# Patient Record
Sex: MALE | Race: WHITE | Employment: FULL TIME | URBAN - METROPOLITAN AREA
[De-identification: names, ages, dates, MRNs, and addresses within clinical notes are randomized per-mention and may not be internally consistent; named-entity substitution may affect disease eponyms.]

---

## 2017-04-01 ENCOUNTER — APPOINTMENT (OUTPATIENT)
Dept: GENERAL RADIOLOGY | Age: 32
End: 2017-04-01
Attending: EMERGENCY MEDICINE
Payer: COMMERCIAL

## 2017-04-01 ENCOUNTER — NURSE ONLY (OUTPATIENT)
Dept: FAMILY MEDICINE CLINIC | Facility: CLINIC | Age: 32
End: 2017-04-01

## 2017-04-01 ENCOUNTER — HOSPITAL ENCOUNTER (OUTPATIENT)
Age: 32
Discharge: HOME | End: 2017-04-01
Attending: EMERGENCY MEDICINE
Payer: COMMERCIAL

## 2017-04-01 VITALS
RESPIRATION RATE: 20 BRPM | BODY MASS INDEX: 25.76 KG/M2 | HEART RATE: 96 BPM | DIASTOLIC BLOOD PRESSURE: 79 MMHG | SYSTOLIC BLOOD PRESSURE: 122 MMHG | TEMPERATURE: 98 F | HEIGHT: 68 IN | WEIGHT: 170 LBS | OXYGEN SATURATION: 96 %

## 2017-04-01 DIAGNOSIS — S61.411A LACERATION OF RIGHT HAND WITHOUT FOREIGN BODY, INITIAL ENCOUNTER: Primary | ICD-10-CM

## 2017-04-01 DIAGNOSIS — Z02.9 ADMINISTRATIVE ENCOUNTER: Primary | ICD-10-CM

## 2017-04-01 PROCEDURE — 12002 RPR S/N/AX/GEN/TRNK2.6-7.5CM: CPT

## 2017-04-01 PROCEDURE — 99203 OFFICE O/P NEW LOW 30 MIN: CPT

## 2017-04-01 PROCEDURE — 90471 IMMUNIZATION ADMIN: CPT

## 2017-04-01 RX ORDER — CEPHALEXIN 250 MG/1
250 TABLET ORAL 3 TIMES DAILY
Qty: 15 TABLET | Refills: 0 | Status: SHIPPED | OUTPATIENT
Start: 2017-04-01 | End: 2018-01-11 | Stop reason: ALTCHOICE
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
## 2018-01-11 ENCOUNTER — OFFICE VISIT (OUTPATIENT)
Dept: DERMATOLOGY CLINIC | Facility: CLINIC | Age: 33
End: 2018-01-11

## 2018-01-11 DIAGNOSIS — D48.5 NEOPLASM OF UNCERTAIN BEHAVIOR OF SKIN: Primary | ICD-10-CM

## 2018-01-11 DIAGNOSIS — D23.5 BENIGN NEOPLASM OF SKIN OF TRUNK, EXCEPT SCROTUM: ICD-10-CM

## 2018-01-11 PROCEDURE — 99201 OFFICE/OUTPT VISIT,NEW,LEVL I: CPT | Performed by: DERMATOLOGY

## 2018-01-11 PROCEDURE — 11100 BIOPSY OF SKIN LESION: CPT | Performed by: DERMATOLOGY

## 2018-01-11 PROCEDURE — 99212 OFFICE O/P EST SF 10 MIN: CPT | Performed by: DERMATOLOGY

## 2018-01-11 PROCEDURE — 88305 TISSUE EXAM BY PATHOLOGIST: CPT | Performed by: DERMATOLOGY

## 2018-01-11 NOTE — PROGRESS NOTES
HPI:     Chief Complaint     Lesion        HPI     Lesion    Additional comments: Pt states has seen Dr. Rich Turner  in the past. Pt presenting with lesions to lower abdomen and groin region Pt states has seen changes with groin lesion.  Pt has family hx of m EXAM:   Patient is alert and oriented and appears their stated age. They are well-nourished. Exam is remarkable for the following-  1. There is a 1.2 cm brown soft slightly pedunculated, well-defined homogeneous papule at the mid waistline.   2.  There i

## 2018-01-11 NOTE — PROCEDURES
Procedural Report for Shave Biopsy    Procedure: With the patient is a supine position, the skin was scrubbed with alcohol. Anesthesia was obtained by injecting 3 mL of 1% Xylocaine with Epinephrine.   The skin surrounding the lession was placed under t

## 2019-11-12 ENCOUNTER — OFFICE VISIT (OUTPATIENT)
Dept: INTEGRATIVE MEDICINE | Facility: CLINIC | Age: 34
End: 2019-11-12
Payer: COMMERCIAL

## 2019-11-12 VITALS
BODY MASS INDEX: 28.13 KG/M2 | HEIGHT: 68 IN | OXYGEN SATURATION: 96 % | TEMPERATURE: 98 F | SYSTOLIC BLOOD PRESSURE: 118 MMHG | DIASTOLIC BLOOD PRESSURE: 76 MMHG | HEART RATE: 87 BPM | WEIGHT: 185.63 LBS

## 2019-11-12 DIAGNOSIS — Z23 NEED FOR TETANUS BOOSTER: ICD-10-CM

## 2019-11-12 DIAGNOSIS — Z00.00 ROUTINE PHYSICAL EXAMINATION: Primary | ICD-10-CM

## 2019-11-12 DIAGNOSIS — Z23 NEEDS FLU SHOT: ICD-10-CM

## 2019-11-12 PROCEDURE — 90471 IMMUNIZATION ADMIN: CPT | Performed by: PHYSICIAN ASSISTANT

## 2019-11-12 PROCEDURE — 99385 PREV VISIT NEW AGE 18-39: CPT | Performed by: PHYSICIAN ASSISTANT

## 2019-11-12 PROCEDURE — 90472 IMMUNIZATION ADMIN EACH ADD: CPT | Performed by: PHYSICIAN ASSISTANT

## 2019-11-12 PROCEDURE — 90715 TDAP VACCINE 7 YRS/> IM: CPT | Performed by: PHYSICIAN ASSISTANT

## 2019-11-12 PROCEDURE — 90686 IIV4 VACC NO PRSV 0.5 ML IM: CPT | Performed by: PHYSICIAN ASSISTANT

## 2019-11-12 NOTE — PROGRESS NOTES
Thea Lim is a 37year old male. Patient presents with:  Establish Care: flu vaccine , tdap       HPI:   Has a baby on the way. Gets flu every other year. Wants flu shot and pertusis booster. Has not had routine lab work done in a long time.  He wo Packs/day: 0.00    Substance and Sexual Activity      Alcohol use: Never        Frequency: Never      Drug use: Never      Sexual activity: Not on file    Lifestyle      Physical activity:        Days per week: Not on file        Minutes per session: No Pulmonary/Chest: Effort normal and breath sounds normal.   Abdominal: Soft. Bowel sounds are normal. He exhibits no mass. There is no tenderness. There is no guarding. Lymphadenopathy:     He has no cervical adenopathy.    Neurological: He is alert and 0.5 ml Quad PF [id number]      There are no Patient Instructions on file for this visit. Return in about 6 months (around 5/12/2020) for Follow Up (30 min). Patient affirmed understanding of plan and all questions were answered.      MYLES Mathew

## 2019-11-13 ENCOUNTER — LAB ENCOUNTER (OUTPATIENT)
Dept: LAB | Facility: HOSPITAL | Age: 34
End: 2019-11-13
Attending: PHYSICIAN ASSISTANT
Payer: COMMERCIAL

## 2019-11-13 DIAGNOSIS — Z00.00 ROUTINE PHYSICAL EXAMINATION: ICD-10-CM

## 2019-11-13 PROCEDURE — 84443 ASSAY THYROID STIM HORMONE: CPT

## 2019-11-13 PROCEDURE — 84481 FREE ASSAY (FT-3): CPT

## 2019-11-13 PROCEDURE — 80053 COMPREHEN METABOLIC PANEL: CPT

## 2019-11-13 PROCEDURE — 84439 ASSAY OF FREE THYROXINE: CPT

## 2019-11-13 PROCEDURE — 85025 COMPLETE CBC W/AUTO DIFF WBC: CPT

## 2019-11-13 PROCEDURE — 82728 ASSAY OF FERRITIN: CPT

## 2019-11-13 PROCEDURE — 80500 HEPATITIS B PROFILE: CPT

## 2019-11-13 PROCEDURE — 82306 VITAMIN D 25 HYDROXY: CPT

## 2019-11-13 PROCEDURE — 86706 HEP B SURFACE ANTIBODY: CPT

## 2019-11-13 PROCEDURE — 82746 ASSAY OF FOLIC ACID SERUM: CPT

## 2019-11-13 PROCEDURE — 82607 VITAMIN B-12: CPT

## 2019-11-13 PROCEDURE — 80061 LIPID PANEL: CPT

## 2019-11-13 PROCEDURE — 87340 HEPATITIS B SURFACE AG IA: CPT

## 2019-11-13 PROCEDURE — 83036 HEMOGLOBIN GLYCOSYLATED A1C: CPT

## 2019-11-13 PROCEDURE — 86704 HEP B CORE ANTIBODY TOTAL: CPT

## 2019-11-13 PROCEDURE — 36415 COLL VENOUS BLD VENIPUNCTURE: CPT

## 2019-11-14 NOTE — PROGRESS NOTES
Dale Liang,     It was so nice meeting you the other day. Your vitamin D is low. Ideal levels are closer to 60. I recommend the following:     Vitamin D is an important backbone for many hormones and neurotransmitters.  It is important for energy and mo

## 2020-08-11 ENCOUNTER — TELEMEDICINE (OUTPATIENT)
Dept: FAMILY MEDICINE CLINIC | Facility: CLINIC | Age: 35
End: 2020-08-11
Payer: COMMERCIAL

## 2020-08-11 ENCOUNTER — TELEPHONE (OUTPATIENT)
Dept: INTEGRATIVE MEDICINE | Facility: CLINIC | Age: 35
End: 2020-08-11

## 2020-08-11 DIAGNOSIS — R50.9 ELEVATED TEMPERATURE: Primary | ICD-10-CM

## 2020-08-11 DIAGNOSIS — R09.81 NASAL CONGESTION: ICD-10-CM

## 2020-08-11 PROCEDURE — 99202 OFFICE O/P NEW SF 15 MIN: CPT | Performed by: NURSE PRACTITIONER

## 2020-08-11 NOTE — PROGRESS NOTES
Due to the real risk of possible exposure to Coronavirus (CoV-2, COVID-19) in the office/medical building and recommendations for social distancing (key to mitigation/limiting the spread of the virus) a Virtual or Telemedicine visit over the phone was perf above.  Coding/billing information is submitted for this visit based on complexity of care and/or time spent for the visit.     HPI:  Patient presents with:  Acute: elevated temperature on screening before work      Red Mckeon is a 38year old male who medical history. History reviewed. No pertinent surgical history. ASSESSMENT AND PLAN:   1. Patient is a 38year old male who calls for: elevated temperature on screening from work. Additional Assessment and Plan:  1.  Elevated temperature  -[place]

## (undated) NOTE — ED AVS SNAPSHOT
[facility] in [facility] [address].  [address] [facility]    Phone:  [phone]    Fax:  [phone]           Marina Mcgrath   MRN: [id number]    Department:  [facility] in [address] [place] [address]   Date of Visit: Discharge Instructions       Sutures to be removed in the next 8-10 days    Discharge References/Attachments     LACERATION, HAND: ALL CLOSURES (ENGLISH)      Disclosure     Insurance plans vary and the physician(s) referred by the Immediate Care may no Registration line at [phone] or find a doctor online by visiting [email or web address].    IF THERE IS ANY CHANGE OR WORSENING OF YOUR CONDITION, CALL YOUR PRIMARY CARE PHYSICIAN AT ONCE OR GO TO [address] [place] [address].     If you have been prescribed a - If you are a smoker or have smoked in the last 12 months, we encourage you to explore options for quitting.     - If you have concerns related to behavioral health issues or thoughts of harming yourself, contact [facility] a

## (undated) NOTE — MR AVS SNAPSHOT
[address] [facility] [address]  [place] [address]  [phone]               Thank you for choosing us for your health care visit with [address] [facility] [address]   We are glad to serve you and happy to provide you with this summary of your vi Fully enjoy your food when eating. Don’t eat while distracted and slow down. Avoid over sized portions. Don’t eat while when you’re bored.      EAT THESE FOODS MORE OFTEN: EAT THESE FOODS LESS OFTEN:   Make half your plate fruits and vegetables Highly